# Patient Record
(demographics unavailable — no encounter records)

---

## 2025-05-07 NOTE — PHYSICAL EXAM
[No Acute Distress] : no acute distress [Well Nourished] : well nourished [Well Developed] : well developed [Well-Appearing] : well-appearing [Normal Sclera/Conjunctiva] : normal sclera/conjunctiva [PERRL] : pupils equal round and reactive to light [EOMI] : extraocular movements intact [Normal Outer Ear/Nose] : the outer ears and nose were normal in appearance [Normal Oropharynx] : the oropharynx was normal [No JVD] : no jugular venous distention [No Respiratory Distress] : no respiratory distress  [No Accessory Muscle Use] : no accessory muscle use [Clear to Auscultation] : lungs were clear to auscultation bilaterally [Normal Rate] : normal rate  [Regular Rhythm] : with a regular rhythm [Normal S1, S2] : normal S1 and S2 [No Murmur] : no murmur heard [No Carotid Bruits] : no carotid bruits [No Edema] : there was no peripheral edema [No Palpable Aorta] : no palpable aorta [Soft] : abdomen soft [Non Tender] : non-tender [Non-distended] : non-distended [No Masses] : no abdominal mass palpated [No HSM] : no HSM [Normal Bowel Sounds] : normal bowel sounds [No Joint Swelling] : no joint swelling [Grossly Normal Strength/Tone] : grossly normal strength/tone [No Rash] : no rash [Coordination Grossly Intact] : coordination grossly intact [No Focal Deficits] : no focal deficits [Normal Gait] : normal gait [Deep Tendon Reflexes (DTR)] : deep tendon reflexes were 2+ and symmetric [Normal Affect] : the affect was normal [Normal Insight/Judgement] : insight and judgment were intact

## 2025-05-07 NOTE — HISTORY OF PRESENT ILLNESS
[FreeTextEntry1] : Physical Exam [de-identified] : Patient is a 44-year-old female with PMH of L breast papilloma s/p excision (2019), Pre-diabetes, and x4 C-sections who presents to establish care. She denies any recent illnesses, no cp, sob, f,c,n,v,d.

## 2025-05-07 NOTE — ASSESSMENT
[FreeTextEntry1] : Patient is a 44-year-old female with PMH of L breast papilloma s/p excision (2019), Pre-diabetes, and x4 C-sections who presents to establish care. She denies any recent illnesses, no cp, sob, f,c,n,v,d.  #Pre-diabetes - check a1c, lipid panel, tsh - cbc, cmp  #Hx of L Breast papilloma - no FH of cancer; only benign breast mass in sister - told pt to bring records, she reports biopsy showed it was benign - order mammogram  #HCM - pt has GYN for pap smear - pt turns 45 in June; ordered Colonoscopy

## 2025-05-19 NOTE — HISTORY OF PRESENT ILLNESS
[de-identified] : Patient is a 44-year-old female with PMH of L breast papilloma s/p excision (2019), Pre-diabetes, and x4 C-sections who presents for fu. She denies any recent illnesses, no cp, sob, f,c,n,v,d. Here today bc labwork showed Hgb 10.7. Pt states has been anemic since 1998. No sx or complaints. Was supposed to get Venofer in past but due to insurance issues could not. Open to trying oral iron.  [FreeTextEntry1] : Fu

## 2025-05-19 NOTE — HISTORY OF PRESENT ILLNESS
[de-identified] : Patient is a 44-year-old female with PMH of L breast papilloma s/p excision (2019), Pre-diabetes, and x4 C-sections who presents for fu. She denies any recent illnesses, no cp, sob, f,c,n,v,d. Here today bc labwork showed Hgb 10.7. Pt states has been anemic since 1998. No sx or complaints. Was supposed to get Venofer in past but due to insurance issues could not. Open to trying oral iron.  [FreeTextEntry1] : Fu

## 2025-05-19 NOTE — ASSESSMENT
[FreeTextEntry1] : Patient is a 44-year-old female with PMH of L breast papilloma s/p excision (2019), Pre-diabetes, and x4 C-sections who presents to establish care. She denies any recent illnesses, no cp, sob, f,c,n,v,d.  #Normocytic Anemia  - Hgb 10.7, MCV 81  - Iron studies, Hgb electrophoresis, B12, folate  - Has heavy periods. has GI eval in a few months for screening  - Start po iron every other day - 3 month fu w/ labs   #HLD  - lifestyle modification  - Repeat in 3 months   #Hx of L Breast papilloma - no FH of cancer; only benign breast mass in sister - told pt to bring records, she reports biopsy showed it was benign - order mammogram  #HCM - pt has GYN for pap smear - pt turns 45 in June; ordered Colonoscopy